# Patient Record
Sex: MALE | Race: WHITE | NOT HISPANIC OR LATINO | Employment: OTHER | URBAN - METROPOLITAN AREA
[De-identification: names, ages, dates, MRNs, and addresses within clinical notes are randomized per-mention and may not be internally consistent; named-entity substitution may affect disease eponyms.]

---

## 2017-06-07 PROBLEM — F10.10 ALCOHOL ABUSE: Chronic | Status: ACTIVE | Noted: 2017-06-07

## 2017-06-07 PROBLEM — Z53.20 COLONOSCOPY REFUSED: Chronic | Status: ACTIVE | Noted: 2017-06-07

## 2017-06-07 PROBLEM — I10 ESSENTIAL HYPERTENSION: Status: ACTIVE | Noted: 2017-06-07

## 2017-06-07 PROBLEM — I10 ESSENTIAL HYPERTENSION: Chronic | Status: ACTIVE | Noted: 2017-06-07

## 2017-06-07 PROBLEM — F10.10 ALCOHOL ABUSE: Status: ACTIVE | Noted: 2017-06-07

## 2017-06-07 PROBLEM — Z53.20 COLONOSCOPY REFUSED: Status: ACTIVE | Noted: 2017-06-07

## 2018-05-08 PROBLEM — K70.30 ALCOHOLIC CIRRHOSIS (HCC): Status: ACTIVE | Noted: 2018-05-08

## 2018-05-08 PROBLEM — N39.0 UTI (URINARY TRACT INFECTION): Status: ACTIVE | Noted: 2018-05-08

## 2018-05-08 PROBLEM — R18.8 ASCITES: Status: ACTIVE | Noted: 2018-05-08

## 2018-05-17 PROBLEM — K70.11 ASCITES DUE TO ALCOHOLIC HEPATITIS: Status: ACTIVE | Noted: 2018-05-08

## 2018-05-17 PROBLEM — K70.31 ALCOHOLIC CIRRHOSIS OF LIVER WITH ASCITES (HCC): Status: ACTIVE | Noted: 2018-05-08

## 2018-05-17 PROBLEM — R00.0 TACHYCARDIA: Status: ACTIVE | Noted: 2018-05-17

## 2019-01-30 ENCOUNTER — TELEPHONE (OUTPATIENT)
Dept: TRANSPLANT | Facility: MEDICAL CENTER | Age: 64
End: 2019-01-30

## 2019-01-30 NOTE — TELEPHONE ENCOUNTER
Called Ke to f/u with OTLE referral by Dr. Marin. Introduced the [facility], the role of this clinic within [place]'s transplant program, and my role as the Transplant Coordinator. The patient's responsibility to this evaluation was also discussed to include participating in scheduled testing and appointments.    Ke identified his wife Clifton as the primary support person. Ke indicated that he does not work but Clifton works full time. Clifton to call this RN back to discuss work schedule and to schedule testing for eval.      All questions and concerns were addressed and pt verbalized understanding. This RN's contact information provided and patient was advised on next steps.     Pt wishes to be evaluated. Orders to be placed per Dr. Coombs's request.

## 2019-02-01 DIAGNOSIS — K70.30 ALCOHOLIC CIRRHOSIS, UNSPECIFIED WHETHER ASCITES PRESENT (HCC): ICD-10-CM

## 2019-02-01 DIAGNOSIS — K70.31 ALCOHOLIC CIRRHOSIS OF LIVER WITH ASCITES (HCC): ICD-10-CM

## 2019-02-01 DIAGNOSIS — K76.0 FATTY (CHANGE OF) LIVER, NOT ELSEWHERE CLASSIFIED: ICD-10-CM

## 2019-02-05 DIAGNOSIS — K70.30 ALCOHOLIC CIRRHOSIS, UNSPECIFIED WHETHER ASCITES PRESENT (HCC): ICD-10-CM

## 2019-02-11 ENCOUNTER — TELEPHONE (OUTPATIENT)
Dept: TRANSPLANT | Facility: MEDICAL CENTER | Age: 64
End: 2019-02-11

## 2019-02-11 NOTE — TELEPHONE ENCOUNTER
Pt wife Clifton had left a message and an email that they would not be able to make it to town to complete testing as they are snowed in and unable to make that drive. They wish to reschedule to March or April when the snow lets up and melts more. I called and left a message that I had gotten the call and that I would unschedule all appointments and that they can call back to schedule when ready. I also responded to the email to let them know that I got the call.

## 2019-02-14 ENCOUNTER — APPOINTMENT (OUTPATIENT)
Dept: CARDIOLOGY | Facility: MEDICAL CENTER | Age: 64
End: 2019-02-14
Attending: INTERNAL MEDICINE
Payer: COMMERCIAL

## 2019-02-14 ENCOUNTER — APPOINTMENT (OUTPATIENT)
Dept: RADIOLOGY | Facility: MEDICAL CENTER | Age: 64
End: 2019-02-14
Attending: INTERNAL MEDICINE
Payer: COMMERCIAL

## 2019-02-28 ENCOUNTER — APPOINTMENT (OUTPATIENT)
Dept: PULMONOLOGY | Facility: MEDICAL CENTER | Age: 64
End: 2019-02-28
Payer: COMMERCIAL

## 2019-07-16 DIAGNOSIS — K70.31 ALCOHOLIC CIRRHOSIS OF LIVER WITH ASCITES (HCC): ICD-10-CM

## 2019-07-16 DIAGNOSIS — K70.30 ALCOHOLIC CIRRHOSIS, UNSPECIFIED WHETHER ASCITES PRESENT (HCC): ICD-10-CM

## 2019-07-17 ENCOUNTER — OFFICE VISIT (OUTPATIENT)
Dept: TRANSPLANT | Facility: MEDICAL CENTER | Age: 64
End: 2019-07-17
Payer: COMMERCIAL

## 2019-07-17 VITALS
WEIGHT: 151.46 LBS | RESPIRATION RATE: 18 BRPM | BODY MASS INDEX: 25.86 KG/M2 | SYSTOLIC BLOOD PRESSURE: 108 MMHG | DIASTOLIC BLOOD PRESSURE: 72 MMHG | TEMPERATURE: 97.8 F | HEIGHT: 64 IN | HEART RATE: 82 BPM | OXYGEN SATURATION: 96 %

## 2019-07-17 DIAGNOSIS — K70.30 ALCOHOLIC CIRRHOSIS OF LIVER WITHOUT ASCITES (HCC): ICD-10-CM

## 2019-07-17 PROBLEM — K70.11 ASCITES DUE TO ALCOHOLIC HEPATITIS: Status: RESOLVED | Noted: 2018-05-08 | Resolved: 2019-07-17

## 2019-07-17 PROBLEM — F10.10 ALCOHOL ABUSE: Chronic | Status: RESOLVED | Noted: 2017-06-07 | Resolved: 2019-07-17

## 2019-07-17 PROCEDURE — 99204 OFFICE O/P NEW MOD 45 MIN: CPT | Performed by: INTERNAL MEDICINE

## 2019-07-17 RX ORDER — SIMVASTATIN 20 MG
20 TABLET ORAL NIGHTLY
COMMUNITY

## 2019-07-17 NOTE — PROGRESS NOTES
Liver Transplant Clinic Note    7/17/2019     Referring Provider: Dr. Solis [facility] Martita Singer  Primary Care Provider: LETICIA Teresa    Reason for Consultation: NPC for management of ESLD and portal hypertension due to ETOH cirrhosis    History of Present Illness:  Ke Jimenez is a 64 y.o. male who presents today for initial consultation in the transplant hepatology clinic. He is accompanied by his wife Clifton.    When he initially presented he was quite decompensated with ascites required LVP, MELD 20 in 5/2018.   TB was 4.2, Alb 2.0, Cr 1.2  , Plt 66  He did stop all alcohol at that time, and has stabilized.   His last LVP was 9/2018, and he underwent Umbilical Hernia surgery 11/2018 with no complications or recurrence of ascites.   His sarcopenia has resolved.     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   He reports no limitations.  No CP or SOB or FRANCISCO, no constipation or diarrhea  Weight has been stable, he does have some intiermittent leg and hand muscle cramping.     Review of systems  A complete organ system review was performed, positives are noted in the HPI, otherwise it was negative and non contributory               Past Medical History:  Past Medical History:   Diagnosis Date   • Allergy     Seasonal    • Cirrhosis (HCC)    • Eczema    • Heartburn    • Hypertension    • Indigestion    • Joint pain    • Joint stiffness        Past Surgical History:  Past Surgical History:   Procedure Laterality Date   • APPENDECTOMY      approx age 32   • OTHER ORTHOPEDIC SURGERY      multiple   • SHOULDER SURGERY Bilateral     multiple on both shoulders         Current Outpatient Prescriptions   Medication Sig Dispense Refill   • simvastatin (ZOCOR) 20 MG Tab Take 20 mg by mouth every evening.     • tamsulosin (FLOMAX) 0.4 MG capsule TAKE 1  CAPSULE BY MOUTH  EVERY DAY 90 Cap 0   • furosemide (LASIX) 20 MG Tab Take 1 Tab by mouth every day. 60 Tab 0   • spironolactone (ALDACTONE) 25 MG Tab Take 1 Tab by mouth every day. (Patient taking differently: Take 100 mg by mouth every day.) 30 Tab 0   • pantoprazole (PROTONIX) 40 MG Tablet Delayed Response Take 1 Tab by mouth every day. 30 Tab 0   • hydrOXYzine HCl (ATARAX) 25 MG Tab Take 2 Tabs by mouth at bedtime as needed for Itching. (Patient not taking: Reported on 7/17/2019) 90 Tab 2   • Diclofenac Sodium 1 % Gel Apply twice daily to affected areas on lumbar spine. (Patient not taking: Reported on 7/17/2019) 3 Tube 0   • folic acid (FOLVITE) 1 MG Tab Take 1 Tab by mouth every day. (Patient not taking: Reported on 7/17/2019) 30 Tab 0   • thiamine (THIAMINE) 100 MG tablet Take 1 Tab by mouth every day. (Patient not taking: Reported on 7/17/2019) 30 Tab 0     No current facility-administered medications for this visit.        Allergies:   Allergies   Allergen Reactions   • Ibuprofen Vomiting         Social History:   Social History     Social History   • Marital status:      Spouse name: N/A   • Number of children: N/A   • Years of education: N/A     Occupational History   • Not on file.     Social History Main Topics   • Smoking status: Former Smoker     Types: Cigarettes   • Smokeless tobacco: Current User     Types: Chew      Comment: quit in 1975   • Alcohol use No      Comment: Last drink May 1, 2018. When did drink, 3-4 shots wiskey daily   • Drug use: No   • Sexual activity: Yes     Partners: Female     Other Topics Concern   • Not on file     Social History Narrative   • No narrative on file       Tobacco: currently chew's tobacco  Alcohol: stopped 5/2018  Illicit Drugs: none      Family History:  Family History   Problem Relation Age of Onset   • Alzheimer's Disease Mother    • Other Brother         Suicide       Physical Examination:  /72 (BP Location: Right arm, Patient Position:  "Sitting)   Pulse 82   Temp 36.6 °C (97.8 °F) (Temporal)   Resp 18   Ht 1.626 m (5' 4\")   Wt 68.7 kg (151 lb 7.3 oz)   SpO2 96%    Body mass index is 26 kg/m².  Consitutional: alert, pleasant, cooperative  Eyes: anicteric, normal conjunctivae  HENT: NET oropharynx clear, normal dentition  Neck: supple, no jvd, no hepatojugular reflex, no JESSICA  Cardiovascular: normal rate and rhythm, normal heart sounds, no murmur  Pulmonary: normal breath sounds, normal respiratory effort  Abdominal: soft, nontender, nondistended, bowel sounds present, no hepatosplenomegaly, no ascites  Musculoskeletal: no edema  Neurological: alert, oriented x 3, no focal deficits, no asterixis, nml gait and station  Skin: no jaundice, no spider angiomata, no rashes, no palmar erythema   Psych: normal mood and affect    Labs:  7/12/19  CMP:   Na 135, K 4.6, CO2 19  BUN 31, Cr 1.5,  TB 0.9m AST 16, ALT 9    Alb 3.8  INR 1.2        Imaging/Studies:  ABD US    Assessment: Ke Jimenez is a 64 y.o. male who presents today for initial consultation in the liver transplant clinic.  He initially presented with decompensation due to alcoholic cirrhosis with ascites and jaundice.   Since ETOH cessation he has stabilized quite well.  Alb normal, nml INR and TB.   Discussed with pt and his wife he does not need a liver transplant at this juncture.   Recommended ongoing management with his PCP and GI Dr. Solis.     Recommend:     Needs ferritin, iron and transferrin sat - if elevated MRI to check for iron overload  Needs CT triphasic or MRI with IV contrast for hepatoma screening every 6 months with AFP   needs Hep B vaccinations  If not done  Recommend decrease lasix to 20mg daily and spironolacdtone 50mg daily    Happy to see pt in follow up if needed, in [facility] outreach clinic.     Kacey Coombs M.D.  Clinical           "

## 2019-07-17 NOTE — PATIENT INSTRUCTIONS
You do not need a liver transplant at this time  You do need liver cancer screening with an Ultrasound and CT scan with IV contrast alternating every 6 months  Blood work every 6 months should include complete metabolic panel and AFP     For now, consider decreasing your water pills as you may not need as much  Take furosemide 20mg (1/2 tablet), and spironolactone 50mg (1/2 tablet) daily and weigh yourself daily

## 2023-02-10 ENCOUNTER — APPOINTMENT (OUTPATIENT)
Dept: LAB | Facility: MEDICAL CENTER | Age: 68
End: 2023-02-10
Payer: COMMERCIAL

## 2023-03-15 ENCOUNTER — HOSPITAL ENCOUNTER (OUTPATIENT)
Dept: RADIOLOGY | Facility: MEDICAL CENTER | Age: 68
End: 2023-03-15
Attending: INTERNAL MEDICINE
Payer: COMMERCIAL

## 2023-03-15 DIAGNOSIS — K70.30 ALCOHOLIC CIRRHOSIS, UNSPECIFIED WHETHER ASCITES PRESENT (HCC): ICD-10-CM

## 2023-03-15 PROCEDURE — 76981 USE PARENCHYMA: CPT
